# Patient Record
Sex: MALE | Race: BLACK OR AFRICAN AMERICAN
[De-identification: names, ages, dates, MRNs, and addresses within clinical notes are randomized per-mention and may not be internally consistent; named-entity substitution may affect disease eponyms.]

---

## 2023-08-31 ENCOUNTER — HOSPITAL ENCOUNTER (EMERGENCY)
Dept: HOSPITAL 76 - ED | Age: 23
Discharge: HOME | End: 2023-08-31
Payer: COMMERCIAL

## 2023-08-31 VITALS — SYSTOLIC BLOOD PRESSURE: 130 MMHG | OXYGEN SATURATION: 99 % | DIASTOLIC BLOOD PRESSURE: 73 MMHG

## 2023-08-31 DIAGNOSIS — Z20.822: ICD-10-CM

## 2023-08-31 DIAGNOSIS — R07.0: Primary | ICD-10-CM

## 2023-08-31 LAB
B PARAPERT DNA SPEC QL NAA+PROBE: NOT DETECTED
B PERT DNA SPEC QL NAA+PROBE: NOT DETECTED
C PNEUM DNA NPH QL NAA+NON-PROBE: NOT DETECTED
FLUAV RNA RESP QL NAA+PROBE: NOT DETECTED
HAEM INFLU B DNA SPEC QL NAA+PROBE: NOT DETECTED
HCOV 229E RNA SPEC QL NAA+PROBE: NOT DETECTED
HCOV HKU1 RNA UPPER RESP QL NAA+PROBE: NOT DETECTED
HCOV NL63 RNA ASPIRATE QL NAA+PROBE: NOT DETECTED
HCOV OC43 RNA SPEC QL NAA+PROBE: NOT DETECTED
HMPV AG SPEC QL: NOT DETECTED
HPIV1 RNA NPH QL NAA+PROBE: NOT DETECTED
HPIV2 SPEC QL CULT: NOT DETECTED
HPIV3 AB TITR SER CF: NOT DETECTED {TITER}
HPIV4 RNA SPEC QL NAA+PROBE: NOT DETECTED
M PNEUMO DNA SPEC QL NAA+PROBE: NOT DETECTED
RSV RNA RESP QL NAA+PROBE: NOT DETECTED
RV+EV RNA SPEC QL NAA+PROBE: NOT DETECTED
SARS-COV-2 RNA PNL SPEC NAA+PROBE: DETECTED

## 2023-08-31 PROCEDURE — 87430 STREP A AG IA: CPT

## 2023-08-31 PROCEDURE — 87633 RESP VIRUS 12-25 TARGETS: CPT

## 2023-08-31 PROCEDURE — 99283 EMERGENCY DEPT VISIT LOW MDM: CPT

## 2023-08-31 PROCEDURE — 87070 CULTURE OTHR SPECIMN AEROBIC: CPT

## 2023-08-31 NOTE — ED PHYSICIAN DOCUMENTATION
History of Present Illness





- Stated complaint


Stated Complaint: SORE THROAT,COUGH





- Chief complaint


Chief Complaint: Heent





- Additonal information


Additional information: 


23-year-old male [employer] presents to the emergency department for 

evaluation of sore throat.  Patient reports that he was exposed to somebody 

yesterday in the [employer] that tested positive for COVID today has a sore throat.  

No cough no fevers.  He is vaccinated though not boosted for COVID.  The 

exception of a sore throat he has no other symptomatology.  Unremarkable past 

medical history.








Review of Systems


Constitutional: reports: Reviewed and negative


Throat: reports: Sore throat


Cardiac: reports: Reviewed and negative


Respiratory: reports: Reviewed and negative


GI: reports: Reviewed and negative





PD PAST MEDICAL HISTORY





- Allergies


Allergies/Adverse Reactions: 


                                    Allergies











Allergy/AdvReac Type Severity Reaction Status Date / Time


 


No Known Drug Allergies Allergy   Verified 08/31/23 18:48














PD ED PE NORMAL





- General


General: Alert and oriented X 3, No acute distress, Well developed/nourished





- HEENT


HEENT: Moist mucous membranes, Pharynx benign





- Neck


Neck: Supple, no meningeal sign, No adenopathy





- Cardiac


Cardiac: RRR, No murmur





- Respiratory


Respiratory: No respiratory distress, Clear bilaterally





- Abdomen


Abdomen: Normal bowel sounds, Soft





Results





- Vitals


Vitals: 





                               Vital Signs - 24 hr











  08/31/23





  18:48


 


Temperature 36.8 C


 


Heart Rate 75


 


Respiratory 16





Rate 


 


Blood Pressure 130/73


 


O2 Saturation 99








                                     Oxygen











O2 Source                      Room air

















- Labs


Labs: 





                                Laboratory Tests











  08/31/23





  18:55


 


Group A Strep Rapid  Negative














PD Medical Decision Making





- ED course


Complexity details: reviewed results, re-evaluated patient, d/w patient


ED course: 





23-year-old male here with sore throat.  He is concerned that he may have COVID 

after exposure to somebody that tested positive.


I cardiopulmonary auscultation was unremarkable.  No hypoxia.  Rapid strep test 

is negative.  Respiratory PCR panel is pending.  Patient is advised to maintain 

quarantine or isolation until test results are known and if positive he should 

quarantine for 1 week.  Given unremarkable past medical history with no risk 

factors would not recommend Paxlovid if COVID-positive.  Usual emergent return 

precautions were discussed





Departure





- Departure


Disposition: 01 Home, Self Care


Clinical Impression: 


 Sore throat, Exposure to COVID-19 virus





Condition: Stable


Record reviewed to determine appropriate education?: Yes


Comments: 


You are seen today in the emergency department for sore throat and concern that 

he could have contracted COVID after being exposed to 71 as COVID-positive 

yesterday.





Your strep testing today is negative.





Your COVID test results will not be available for several hours.  You can follow

these up online. If you are COVID-positive you will need to maintain quarantine 

and isolation for 1 week.





Because you are otherwise healthy I would not recommend oral antiviral treatment

if you are COVID-positive.  In general just treat this like a head cold.





Return to the ER if you have any new or worsening symptoms.